# Patient Record
Sex: MALE | Race: ASIAN | NOT HISPANIC OR LATINO | URBAN - METROPOLITAN AREA
[De-identification: names, ages, dates, MRNs, and addresses within clinical notes are randomized per-mention and may not be internally consistent; named-entity substitution may affect disease eponyms.]

---

## 2022-06-20 ENCOUNTER — NEW PATIENT (OUTPATIENT)
Dept: URBAN - METROPOLITAN AREA CLINIC 67 | Facility: CLINIC | Age: 67
End: 2022-06-20

## 2022-06-20 DIAGNOSIS — H27.03: ICD-10-CM

## 2022-06-20 DIAGNOSIS — H44.23: ICD-10-CM

## 2022-06-20 DIAGNOSIS — H43.813: ICD-10-CM

## 2022-06-20 DIAGNOSIS — H44.2B3: ICD-10-CM

## 2022-06-20 DIAGNOSIS — H27.131: ICD-10-CM

## 2022-06-20 DIAGNOSIS — H53.2: ICD-10-CM

## 2022-06-20 PROCEDURE — 92201 OPSCPY EXTND RTA DRAW UNI/BI: CPT

## 2022-06-20 PROCEDURE — 92134 CPTRZ OPH DX IMG PST SGM RTA: CPT

## 2022-06-20 PROCEDURE — 99204 OFFICE O/P NEW MOD 45 MIN: CPT

## 2022-06-20 ASSESSMENT — VISUAL ACUITY
OS_SC: 20/70
OD_SC: 20/30-2

## 2022-06-20 ASSESSMENT — TONOMETRY
OD_IOP_MMHG: 16
OS_IOP_MMHG: 16

## 2022-07-07 ENCOUNTER — SURGERY/PROCEDURE (OUTPATIENT)
Dept: URBAN - METROPOLITAN AREA HOSPITAL 17 | Facility: HOSPITAL | Age: 67
End: 2022-07-07

## 2022-07-07 DIAGNOSIS — H26.101: ICD-10-CM

## 2022-07-07 PROCEDURE — 66850 REMOVAL OF LENS MATERIAL: CPT | Mod: 51,RT,51,RT,80,RT

## 2022-07-07 PROCEDURE — 67036 REMOVAL OF INNER EYE FLUID: CPT | Mod: 80,RT

## 2022-07-08 ENCOUNTER — 1 DAY POST-OP (OUTPATIENT)
Dept: URBAN - METROPOLITAN AREA CLINIC 67 | Facility: CLINIC | Age: 67
End: 2022-07-08

## 2022-07-08 DIAGNOSIS — H27.131: ICD-10-CM

## 2022-07-08 DIAGNOSIS — Z98.890: ICD-10-CM

## 2022-07-08 PROCEDURE — 99024 POSTOP FOLLOW-UP VISIT: CPT

## 2022-07-08 ASSESSMENT — VISUAL ACUITY: OD_SC: CF 2FT

## 2022-07-08 ASSESSMENT — TONOMETRY: OD_IOP_MMHG: 7,8

## 2022-07-18 ENCOUNTER — POST-OP CHECK (OUTPATIENT)
Dept: URBAN - METROPOLITAN AREA CLINIC 67 | Facility: CLINIC | Age: 67
End: 2022-07-18

## 2022-07-18 DIAGNOSIS — H27.131: ICD-10-CM

## 2022-07-18 DIAGNOSIS — Z98.890: ICD-10-CM

## 2022-07-18 PROCEDURE — 99024 POSTOP FOLLOW-UP VISIT: CPT

## 2022-07-18 ASSESSMENT — TONOMETRY: OD_IOP_MMHG: 11

## 2022-07-18 ASSESSMENT — VISUAL ACUITY: OD_SC: 20/100

## 2022-08-22 ENCOUNTER — POST-OP CHECK (OUTPATIENT)
Dept: URBAN - METROPOLITAN AREA CLINIC 67 | Facility: CLINIC | Age: 67
End: 2022-08-22

## 2022-08-22 DIAGNOSIS — H27.131: ICD-10-CM

## 2022-08-22 DIAGNOSIS — Z98.890: ICD-10-CM

## 2022-08-22 PROCEDURE — 99024 POSTOP FOLLOW-UP VISIT: CPT

## 2022-08-22 PROCEDURE — 92201 OPSCPY EXTND RTA DRAW UNI/BI: CPT

## 2022-08-22 ASSESSMENT — VISUAL ACUITY
OD_SC: 20/80-1
OD_PH: 20/60+2

## 2022-08-22 ASSESSMENT — TONOMETRY: OD_IOP_MMHG: 11

## 2022-10-24 ENCOUNTER — PROBLEM (OUTPATIENT)
Dept: URBAN - METROPOLITAN AREA CLINIC 67 | Facility: CLINIC | Age: 67
End: 2022-10-24

## 2022-10-24 DIAGNOSIS — H27.133: ICD-10-CM

## 2022-10-24 DIAGNOSIS — H44.23: ICD-10-CM

## 2022-10-24 PROCEDURE — 99214 OFFICE O/P EST MOD 30 MIN: CPT

## 2022-10-24 PROCEDURE — 92201 OPSCPY EXTND RTA DRAW UNI/BI: CPT

## 2022-10-24 PROCEDURE — 92134 CPTRZ OPH DX IMG PST SGM RTA: CPT

## 2022-10-24 ASSESSMENT — TONOMETRY: OS_IOP_MMHG: 15

## 2022-10-24 ASSESSMENT — VISUAL ACUITY
OS_SC: 20/100-1
OS_PH: 20/60-1

## 2022-11-03 ENCOUNTER — SURGERY/PROCEDURE (OUTPATIENT)
Dept: URBAN - METROPOLITAN AREA HOSPITAL 17 | Facility: HOSPITAL | Age: 67
End: 2022-11-03

## 2022-11-03 DIAGNOSIS — H27.132: ICD-10-CM

## 2022-11-03 DIAGNOSIS — H43.392: ICD-10-CM

## 2022-11-03 PROCEDURE — 67036 REMOVAL OF INNER EYE FLUID: CPT | Mod: 80

## 2022-11-03 PROCEDURE — 66850 REMOVAL OF LENS MATERIAL: CPT | Mod: 51

## 2022-11-04 ENCOUNTER — 1 DAY POST-OP (OUTPATIENT)
Dept: URBAN - METROPOLITAN AREA CLINIC 67 | Facility: CLINIC | Age: 67
End: 2022-11-04

## 2022-11-04 DIAGNOSIS — H27.133: ICD-10-CM

## 2022-11-04 DIAGNOSIS — Z98.890: ICD-10-CM

## 2022-11-04 DIAGNOSIS — H44.23: ICD-10-CM

## 2022-11-04 PROCEDURE — 92134 CPTRZ OPH DX IMG PST SGM RTA: CPT

## 2022-11-04 PROCEDURE — 99024 POSTOP FOLLOW-UP VISIT: CPT

## 2022-11-04 PROCEDURE — 92202 OPSCPY EXTND ON/MAC DRAW: CPT

## 2022-11-04 ASSESSMENT — TONOMETRY: OS_IOP_MMHG: 18

## 2022-11-04 ASSESSMENT — VISUAL ACUITY
OS_SC: 20/100-2
OS_PH: 20/60

## 2022-11-11 ENCOUNTER — POST-OP CHECK (OUTPATIENT)
Dept: URBAN - METROPOLITAN AREA CLINIC 67 | Facility: CLINIC | Age: 67
End: 2022-11-11

## 2022-11-11 DIAGNOSIS — H27.133: ICD-10-CM

## 2022-11-11 DIAGNOSIS — Z98.890: ICD-10-CM

## 2022-11-11 PROCEDURE — 99024 POSTOP FOLLOW-UP VISIT: CPT

## 2022-11-11 ASSESSMENT — VISUAL ACUITY
OS_PH: 20/50-1
OS_SC: 20/150-1

## 2022-11-11 ASSESSMENT — TONOMETRY: OS_IOP_MMHG: 10

## 2022-11-28 ENCOUNTER — POST-OP CHECK (OUTPATIENT)
Dept: URBAN - METROPOLITAN AREA CLINIC 67 | Facility: CLINIC | Age: 67
End: 2022-11-28

## 2022-11-28 DIAGNOSIS — Z98.890: ICD-10-CM

## 2022-11-28 DIAGNOSIS — H27.133: ICD-10-CM

## 2022-11-28 PROCEDURE — 99024 POSTOP FOLLOW-UP VISIT: CPT

## 2022-11-28 ASSESSMENT — TONOMETRY: OS_IOP_MMHG: 13

## 2022-11-28 ASSESSMENT — VISUAL ACUITY
OS_PH: 20/30-2
OS_SC: 20/70

## 2023-11-07 ENCOUNTER — APPOINTMENT (RX ONLY)
Dept: URBAN - METROPOLITAN AREA CLINIC 42 | Facility: CLINIC | Age: 68
Setting detail: DERMATOLOGY
End: 2023-11-07

## 2023-11-07 DIAGNOSIS — L82.0 INFLAMED SEBORRHEIC KERATOSIS: ICD-10-CM

## 2023-11-07 PROCEDURE — 17110 DESTRUCTION B9 LES UP TO 14: CPT

## 2023-11-07 PROCEDURE — ? LIQUID NITROGEN

## 2023-11-07 PROCEDURE — ? COUNSELING

## 2023-11-07 PROCEDURE — ? DIAGNOSIS COMMENT

## 2023-11-07 PROCEDURE — ? OTHER

## 2023-11-07 ASSESSMENT — LOCATION SIMPLE DESCRIPTION DERM: LOCATION SIMPLE: LEFT SHOULDER

## 2023-11-07 ASSESSMENT — LOCATION ZONE DERM: LOCATION ZONE: ARM

## 2023-11-07 ASSESSMENT — LOCATION DETAILED DESCRIPTION DERM: LOCATION DETAILED: LEFT ANTERIOR SHOULDER

## 2023-11-07 NOTE — PROCEDURE: LIQUID NITROGEN
Consent: The patient's consent was obtained including but not limited to risks of crusting, scabbing, blistering, scarring, darker or lighter pigmentary change, recurrence, incomplete removal and infection.
Spray Paint Technique: No
Medical Necessity Information: It is in your best interest to select a reason for this procedure from the list below. All of these items fulfill various CMS LCD requirements except the new and changing color options.
Show Applicator Variable?: Yes
Medical Necessity Clause: This procedure was medically necessary because the lesions that were treated were:
Spray Paint Text: The liquid nitrogen was applied to the skin utilizing a spray paint frosting technique.
Post-Care Instructions: I reviewed with the patient in detail post-care instructions. Patient is to wear sunprotection, and avoid picking at any of the treated lesions. Pt may apply Vaseline to crusted or scabbing areas.
Number Of Freeze-Thaw Cycles: 2 freeze-thaw cycles
Duration Of Freeze Thaw-Cycle (Seconds): 5-10
Detail Level: Detailed

## 2023-11-07 NOTE — PROCEDURE: DIAGNOSIS COMMENT
Comment: Present for about a year \\nPt reports picking at lesion \\nDiscussed RBAs of bx vs ln2\\nLN2 done today \\nConsider bx if no improvement
Render Risk Assessment In Note?: no
Detail Level: Simple